# Patient Record
Sex: MALE | Race: WHITE | HISPANIC OR LATINO | Employment: PART TIME | URBAN - METROPOLITAN AREA
[De-identification: names, ages, dates, MRNs, and addresses within clinical notes are randomized per-mention and may not be internally consistent; named-entity substitution may affect disease eponyms.]

---
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
## 2025-02-01 ENCOUNTER — OFFICE VISIT (OUTPATIENT)
Dept: URGENT CARE | Facility: PHYSICIAN GROUP | Age: 20
End: 2025-02-01
Payer: MEDICAID

## 2025-02-01 VITALS
OXYGEN SATURATION: 97 % | DIASTOLIC BLOOD PRESSURE: 56 MMHG | HEIGHT: 66 IN | HEART RATE: 89 BPM | BODY MASS INDEX: 22.56 KG/M2 | TEMPERATURE: 100.5 F | RESPIRATION RATE: 20 BRPM | WEIGHT: 140.4 LBS | SYSTOLIC BLOOD PRESSURE: 94 MMHG

## 2025-02-01 DIAGNOSIS — R68.89 FLU-LIKE SYMPTOMS: ICD-10-CM

## 2025-02-01 LAB
FLUAV RNA SPEC QL NAA+PROBE: NEGATIVE
FLUBV RNA SPEC QL NAA+PROBE: NEGATIVE
RSV RNA SPEC QL NAA+PROBE: NEGATIVE
SARS-COV-2 RNA RESP QL NAA+PROBE: NEGATIVE

## 2025-02-01 PROCEDURE — 99213 OFFICE O/P EST LOW 20 MIN: CPT | Performed by: PHYSICIAN ASSISTANT

## 2025-02-01 PROCEDURE — 3074F SYST BP LT 130 MM HG: CPT | Performed by: PHYSICIAN ASSISTANT

## 2025-02-01 PROCEDURE — 87637 SARSCOV2&INF A&B&RSV AMP PRB: CPT | Mod: QW | Performed by: PHYSICIAN ASSISTANT

## 2025-02-01 PROCEDURE — 3078F DIAST BP <80 MM HG: CPT | Performed by: PHYSICIAN ASSISTANT

## 2025-02-01 ASSESSMENT — VISUAL ACUITY: OU: 1

## 2025-02-01 NOTE — PROGRESS NOTES
"Update changed pronouns to he subjective:     Verbal consent was acquired by the patient to use [employer] ambient listening note generation during this visit     Milad Taylor is a 19 y.o. male who presents for Fever (X2-3 days) and Chills (X2-3 days)       History of Present Illness  The patient presents for evaluation of fever and chills.    He has been experiencing fevers and chills since Thursday morning, which he attributes to a potential dietary indiscretion. He reports no associated vomiting or diarrhea. His symptoms are characterized by body aches, fevers, and chills. He has been monitoring his temperature at home, which peaked at 104 degrees. His last administration of Tylenol or ibuprofen was at 12:00 PM. He reports no sore throat, cough, or respiratory distress. He also reports no nasal congestion or rhinorrhea. He has no history of streptococcal pharyngitis and reports no difficulty swallowing or eating. Additionally, he reports no otalgia.  He denies sore throat    MEDICATIONS  Tylenol, ibuprofen            Medications:  Non Formulary Request  SUMAtriptan Tabs    Allergies:             Patient has no known allergies.    Past Social Hx:  Milad Taylor  reports that he has never smoked. He has never used smokeless tobacco. He reports that he does not drink alcohol and does not use drugs.           Problem list, medications, and allergies reviewed by myself today in Epic.     Objective:     BP 94/56 (BP Location: Left arm, Patient Position: Sitting, BP Cuff Size: Small adult)   Pulse 89   Temp (!) 38.1 °C (100.5 °F)   Resp 20   Ht 1.676 m (5' 6\")   Wt 63.7 kg (140 lb 6.4 oz)   SpO2 97%   BMI 22.66 kg/m²     Physical Exam  Vitals and nursing note reviewed.   Constitutional:       General: He is not in acute distress.     Appearance: He is well-developed. He is ill-appearing. He is not toxic-appearing or diaphoretic.   HENT:      Head: Normocephalic. No right periorbital erythema " or left periorbital erythema.      Right Ear: Ear canal and external ear normal. No mastoid tenderness. Tympanic membrane is injected. Tympanic membrane is not perforated or bulging.      Left Ear: Ear canal and external ear normal. No mastoid tenderness. Tympanic membrane is injected. Tympanic membrane is not perforated or bulging.      Nose: Mucosal edema present. No congestion or rhinorrhea.      Mouth/Throat:      Mouth: Mucous membranes are moist.      Pharynx: Uvula midline. No posterior oropharyngeal erythema or uvula swelling.   Eyes:      General: Vision grossly intact. No allergic shiner.     Conjunctiva/sclera: Conjunctivae normal.      Pupils: Pupils are equal, round, and reactive to light.   Cardiovascular:      Rate and Rhythm: Normal rate and regular rhythm.      Pulses: Normal pulses.      Heart sounds: Normal heart sounds. No murmur heard.  Pulmonary:      Effort: Pulmonary effort is normal. No tachypnea, accessory muscle usage, prolonged expiration or respiratory distress.      Breath sounds: Normal breath sounds and air entry. No decreased air movement. No decreased breath sounds, wheezing, rhonchi or rales.      Comments: Lungs clear to auscultation bilaterally, no rhonchi rales or wheezes  Musculoskeletal:         General: Normal range of motion.      Cervical back: Normal range of motion and neck supple. No rigidity.   Lymphadenopathy:      Cervical: No cervical adenopathy.   Skin:     General: Skin is warm and dry.   Neurological:      Mental Status: He is alert and oriented to person, place, and time.   Psychiatric:         Behavior: Behavior is cooperative.             Results for orders placed or performed in visit on 02/01/25   POCT CEPHEID COV-2, FLU A/B, RSV - PCR    Collection Time: 02/01/25  4:07 PM   Result Value Ref Range    SARS-CoV-2 by PCR Negative Negative, Invalid    Influenza virus A RNA Negative Negative, Invalid    Influenza virus B, PCR Negative Negative, Invalid    RSV, PCR  Negative Negative, Invalid         Assessment/Plan:     Diagnosis and Associated Orders:     1. Flu-like symptoms  - POCT CEPHEID COV-2, FLU A/B, RSV - PCR        Medical Decision Making:    Pleasant 19 y.o. presents to clinic with:    Assessment & Plan  1. Influenza-like symptoms.  The clinical presentation, including fever, chills, and body aches, suggests a potential viral infection.  Viral panel negative today in the clinic the absence of gastrointestinal symptoms such as abdominal pain, vomiting, or diarrhea, along with the lack of respiratory symptoms such as cough or shortness of breath, further supports this assessment.  Did discuss obtaining strep test however this was not performed as patient denied sore throat or headache.  Patient message regarding results of testing and advised to return for strep testing if symptoms persist, develop sore throat headache nausea vomiting or abdominal pain.  He has been advised to maintain adequate hydration and rest.  Should his condition deteriorate, characterized by the onset of coughing or shortness of breath, he is advised to seek immediate medical attention for reevaluation.    I personally reviewed prior external notes and test results pertinent to today's visit. Patient is clinically stable at today's urgent care visit.  Patient appears nontoxic with no acute distress noted. Appropriate for outpatient care at this time.  Return to clinic or go to ED if symptoms worsen or persist.  Red flag symptoms discussed.  Patient/Parent/Guardian voices understanding.   All side effects of medication discussed including allergic response, GI upset, tendon injury, rash, sedation etc    Please note that this dictation was created using voice recognition software. I have made a reasonable attempt to correct obvious errors, but I expect that there are errors of grammar and possibly content that I did not discover before finalizing the note.    This note was electronically signed  by Jadyn Ashton PA-C

## 2025-07-11 ENCOUNTER — OFFICE VISIT (OUTPATIENT)
Dept: URGENT CARE | Facility: PHYSICIAN GROUP | Age: 20
End: 2025-07-11
Payer: MEDICAID

## 2025-07-11 VITALS
DIASTOLIC BLOOD PRESSURE: 70 MMHG | RESPIRATION RATE: 16 BRPM | WEIGHT: 138.12 LBS | HEIGHT: 66 IN | BODY MASS INDEX: 22.2 KG/M2 | TEMPERATURE: 97.9 F | SYSTOLIC BLOOD PRESSURE: 114 MMHG | HEART RATE: 60 BPM | OXYGEN SATURATION: 99 %

## 2025-07-11 DIAGNOSIS — Z48.02 VISIT FOR SUTURE REMOVAL: Primary | ICD-10-CM

## 2025-07-11 PROCEDURE — 99212 OFFICE O/P EST SF 10 MIN: CPT | Mod: 25 | Performed by: NURSE PRACTITIONER

## 2025-07-11 PROCEDURE — 3078F DIAST BP <80 MM HG: CPT | Performed by: NURSE PRACTITIONER

## 2025-07-11 PROCEDURE — 3074F SYST BP LT 130 MM HG: CPT | Performed by: NURSE PRACTITIONER

## 2025-07-11 PROCEDURE — 15853 REMOVAL SUTR/STAPL XREQ ANES: CPT | Performed by: NURSE PRACTITIONER

## 2025-07-11 NOTE — PROGRESS NOTES
Patient is a very pleasant 19-year-old male who presents for suture removal of 6 sutures on his nose that replaced approximately 6 days ago.  He apparently donated to a bench while playing volleyball.  He had the sutures placed down at [facility].  He is reported no difficulties.  He denies any discharge, increased redness or swelling of the sutures.  6 of them are removed intact without difficulty.  He is advised on wound care.  He will clean the wound with soap and water only and apply antibiotic ointment until completely healed.